# Patient Record
Sex: MALE | Race: WHITE | URBAN - METROPOLITAN AREA
[De-identification: names, ages, dates, MRNs, and addresses within clinical notes are randomized per-mention and may not be internally consistent; named-entity substitution may affect disease eponyms.]

---

## 2023-08-10 LAB — SARS-COV-2: NEGATIVE

## 2023-10-16 RX ORDER — LEVOTHYROXINE SODIUM 88 UG/1
88 TABLET ORAL DAILY
Qty: 31 TABLET | Refills: 12 | Status: SHIPPED | OUTPATIENT
Start: 2023-10-16

## 2023-12-27 ENCOUNTER — TELEMEDICINE (OUTPATIENT)
Age: 58
End: 2023-12-27
Payer: MEDICARE

## 2023-12-27 DIAGNOSIS — F79 INTELLECTUAL DISABILITY: Primary | ICD-10-CM

## 2023-12-27 PROCEDURE — 99213 OFFICE O/P EST LOW 20 MIN: CPT | Performed by: NURSE PRACTITIONER

## 2023-12-27 RX ORDER — METHYLPHENIDATE HYDROCHLORIDE 20 MG/1
20 TABLET ORAL 2 TIMES DAILY
Qty: 60 TABLET | Refills: 0 | Status: SHIPPED | OUTPATIENT
Start: 2023-12-27 | End: 2024-01-26

## 2023-12-27 NOTE — PROGRESS NOTES
Maksim Lu is a 60 y.o. male , id x 2(name and ). Reviewed questionnaires, and  medications. Chief Complaint   Patient presents with    Medication Refill       1. Have you been to the ER, urgent care clinic since your last visit? Hospitalized since your last visit? No    2. Have you seen or consulted any other health care providers outside of the [address] [place] [address] since your last visit? Include any pap smears or colon screening.  No

## 2023-12-27 NOTE — PROGRESS NOTES
Vianney Merchant, was evaluated through a synchronous (real-time) audio-video encounter. The patient (or guardian if applicable) is aware that this is a billable service, which includes applicable co-pays. This Virtual Visit was conducted with patient's (and/or legal guardian's) consent. Patient identification was verified, and a caregiver was present when appropriate. The patient was located at Home: [address]  Provider was located at Home ([facility]): [address] (:  1965) is a Established patient, presenting virtually for evaluation of the following:    Assessment & Plan   Below is the assessment and plan developed based on review of pertinent history, physical exam, labs, studies, and medications. 1. Intellectual disability  -     methylphenidate (RITALIN) 20 MG tablet; Take 1 tablet by mouth 2 times daily for 30 days. Max Daily Amount: 40 mg, Disp-60 tablet, R-0Normal  -     methylphenidate (RITALIN) 20 MG tablet; Take 1 tablet by mouth 2 times daily for 30 days. Max Daily Amount: 40 mg, Disp-60 tablet, R-0Normal  -     methylphenidate (RITALIN) 20 MG tablet; Take 1 tablet by mouth 2 times daily for 30 days. Max Daily Amount: 40 mg, Disp-60 tablet, R-0Normal    Refills updated today  Follow up 3 mo    No follow-ups on file. Subjective   HPI  Patient returns to office for follow up ADD. Stable on medication without weight loss, decreased appetite, insomnia, or tremor. Good focus control. Gets three months of scripts at a time. See med order for dose.     Review of Systems     A comprehensive review of system was obtained and negative except findings in the HPI    Objective   Patient-Reported Vitals  No data recorded     Physical Exam  [INSTRUCTIONS:  \"[x]\" Indicates a positive item  \"[]\" Indicates a negative item  -- DELETE ALL ITEMS NOT EXAMINED]    Constitutional: [x] Appears well-developed and well-nourished [x] No apparent

## 2024-01-15 ENCOUNTER — TELEPHONE (OUTPATIENT)
Age: 59
End: 2024-01-15

## 2024-01-15 RX ORDER — ESCITALOPRAM OXALATE 10 MG/1
10 TABLET ORAL DAILY
Qty: 30 TABLET | Refills: 2 | Status: SHIPPED | OUTPATIENT
Start: 2024-01-15

## 2024-01-15 NOTE — TELEPHONE ENCOUNTER
We received a fax refill request for Bola Merchant.  Please escribe Escitalopram to their pharmacy.  The pharmacy is correct in the chart and they are requesting a 31 day supply.

## 2024-01-16 ENCOUNTER — OFFICE VISIT (OUTPATIENT)
Age: 59
End: 2024-01-16
Payer: MEDICARE

## 2024-01-16 VITALS
TEMPERATURE: 98.4 F | OXYGEN SATURATION: 98 % | BODY MASS INDEX: 23.21 KG/M2 | SYSTOLIC BLOOD PRESSURE: 96 MMHG | HEIGHT: 63 IN | RESPIRATION RATE: 18 BRPM | HEART RATE: 60 BPM | WEIGHT: 131 LBS | DIASTOLIC BLOOD PRESSURE: 63 MMHG

## 2024-01-16 DIAGNOSIS — H91.93 BILATERAL HEARING LOSS, UNSPECIFIED HEARING LOSS TYPE: ICD-10-CM

## 2024-01-16 DIAGNOSIS — Z11.1 SCREENING-PULMONARY TB: Primary | ICD-10-CM

## 2024-01-16 PROCEDURE — G8484 FLU IMMUNIZE NO ADMIN: HCPCS | Performed by: NURSE PRACTITIONER

## 2024-01-16 PROCEDURE — 1036F TOBACCO NON-USER: CPT | Performed by: NURSE PRACTITIONER

## 2024-01-16 PROCEDURE — G8420 CALC BMI NORM PARAMETERS: HCPCS | Performed by: NURSE PRACTITIONER

## 2024-01-16 PROCEDURE — G8428 CUR MEDS NOT DOCUMENT: HCPCS | Performed by: NURSE PRACTITIONER

## 2024-01-16 PROCEDURE — 86580 TB INTRADERMAL TEST: CPT | Performed by: NURSE PRACTITIONER

## 2024-01-16 PROCEDURE — 3017F COLORECTAL CA SCREEN DOC REV: CPT | Performed by: NURSE PRACTITIONER

## 2024-01-16 PROCEDURE — 99213 OFFICE O/P EST LOW 20 MIN: CPT | Performed by: NURSE PRACTITIONER

## 2024-01-16 PROCEDURE — PBSHW MANTOUX TESTING: Performed by: NURSE PRACTITIONER

## 2024-01-16 ASSESSMENT — PATIENT HEALTH QUESTIONNAIRE - PHQ9: DEPRESSION UNABLE TO ASSESS: FUNCTIONAL CAPACITY MOTIVATION LIMITS ACCURACY

## 2024-01-16 NOTE — PROGRESS NOTES
Chief Complaint   Patient presents with    Follow-up     Patient presents in office today with c/o having difficulty hearing.  Caretaker states that he needs quantiferon gold drawn.  No other concerns.     1. \"Have you been to the ER, urgent care clinic since your last visit?  Hospitalized since your last visit?\" No    2. \"Have you seen or consulted any other health care providers outside of the [facility] since your last visit?\" No     3. For patients aged 45-75: Has the patient had a colonoscopy / FIT/ Cologuard? No      If the patient is female:    4. For patients aged 40-74: Has the patient had a mammogram within the past 2 years? NA - based on age or sex      5. For patients aged 21-65: Has the patient had a pap smear? NA - based on age or sex

## 2024-01-16 NOTE — PROGRESS NOTES
Subjective:      Patient ID: Bola PAGE is a 58 y.o. male.    HPI  Patient is here today for PPD test for the year  [facility] stock at [facility]    Brother cleaned ears out over the holidays  [facility] wants them checked, does have some hearing loss that is congenital  Denies pain    Review of Systems  A comprehensive review of system was obtained and negative except findings in the HPI    BP 96/63 (Site: Left Upper Arm, Position: Sitting)   Pulse 60   Temp 98.4 °F (36.9 °C) (Oral)   Resp 18   Ht 1.6 m (5' 3\")   Wt 59.4 kg (131 lb)   SpO2 98%   BMI 23.21 kg/m²   Objective:   Physical Exam  Vitals and nursing note reviewed.   Constitutional:       General: He is not in acute distress.     Appearance: Normal appearance.   HENT:      Right Ear: Tympanic membrane and ear canal normal.      Left Ear: Tympanic membrane and ear canal normal.   Cardiovascular:      Rate and Rhythm: Normal rate and regular rhythm.   Pulmonary:      Effort: Pulmonary effort is normal. No respiratory distress.      Breath sounds: Normal breath sounds. No wheezing or rhonchi.   Musculoskeletal:         General: No swelling.   Neurological:      General: No focal deficit present.      Mental Status: He is alert and oriented to person, place, and time.   Psychiatric:         Mood and Affect: Mood normal.         Assessment / Plan:   Bola was seen today for follow-up.    Diagnoses and all orders for this visit:    Screening-pulmonary TB  -     Cancel: QuantiFERON In Tube ([facility] Default); Future  -     Mantoux testing    Bilateral hearing loss, unspecified hearing loss type      PPD placed  Recheck Thursday  Reviewed hearing loss and wax buildup    I have discussed the diagnosis with the patient and the intended plan as seen in the above orders. The patient has received an after-visit summary and questions were answered concerning future plans. Patient conveyed understanding of the plan at the time of the visit.    Radha MIRZA

## 2024-01-17 ENCOUNTER — TELEPHONE (OUTPATIENT)
Age: 59
End: 2024-01-17

## 2024-01-17 ENCOUNTER — OFFICE VISIT (OUTPATIENT)
Age: 59
End: 2024-01-17
Payer: MEDICAID

## 2024-01-17 VITALS
HEIGHT: 63 IN | WEIGHT: 131 LBS | BODY MASS INDEX: 23.21 KG/M2 | TEMPERATURE: 96.4 F | DIASTOLIC BLOOD PRESSURE: 70 MMHG | SYSTOLIC BLOOD PRESSURE: 130 MMHG

## 2024-01-17 DIAGNOSIS — G30.0 MODERATE EARLY ONSET ALZHEIMER'S DEMENTIA WITHOUT BEHAVIORAL DISTURBANCE, PSYCHOTIC DISTURBANCE, MOOD DISTURBANCE, OR ANXIETY (HCC): Primary | ICD-10-CM

## 2024-01-17 DIAGNOSIS — F02.B0 MODERATE EARLY ONSET ALZHEIMER'S DEMENTIA WITHOUT BEHAVIORAL DISTURBANCE, PSYCHOTIC DISTURBANCE, MOOD DISTURBANCE, OR ANXIETY (HCC): Primary | ICD-10-CM

## 2024-01-17 PROCEDURE — 99244 OFF/OP CNSLTJ NEW/EST MOD 40: CPT | Performed by: PSYCHIATRY & NEUROLOGY

## 2024-01-17 RX ORDER — DONEPEZIL HYDROCHLORIDE 5 MG/1
5 TABLET, FILM COATED ORAL NIGHTLY
Qty: 30 TABLET | Refills: 5 | Status: SHIPPED | OUTPATIENT
Start: 2024-01-17

## 2024-01-17 NOTE — PROGRESS NOTES
includes daytime somnolence due to inadequate sleep hygiene and SUZETTE or related to his dementia. Patient has progressive memory problem previously seen by Dr Erickson in 2013. Tried Namenda XR but made him agitated and has a hard time taking a pill and so was stopped. Possible underlying Alzheimer's dementia, moderate stage.    RECOMMENDATIONS  1. I had a long discussion with patient and staff from [facility]. Discussed diagnosis, pathophysiology prognosis, available treatment and formulating plan. Reviewed test results. All questions were answered.  2. Will put back on Aricept 5 mg QHS to see if lack of response improve  3. Still on Lexapro for hany  4. Also still on Ritalin for hypersomnolence  5. Advise staff of [facility] to revisit reason why he is not on a CPAP for SUZETTE. Not sure if patient will be able to tolerate repeating sleep studies.  6. Reviewed medical records in [facility]    Return in about 3 months (around 4/17/2024).       Thank you for the consultation      Asad Moore MD  Diplomate, American Board of Psychiatry and Neurology  Diplomate, Neuromuscular Medicine  Diplomate, American Board of Electrodiagnostic Medicine          CC: Radha Hopkins, APRN - NP  Fax: [phone]

## 2024-01-18 ENCOUNTER — TELEPHONE (OUTPATIENT)
Age: 59
End: 2024-01-18

## 2024-01-18 LAB
INDURATION: 0
TB SKIN TEST: NORMAL

## 2024-01-18 NOTE — TELEPHONE ENCOUNTER
Pt's caregiver came into the office requesting an alternative medication for the Ritalin because the pharmacy told her that their is a shortage on the Ritalin and they do not know when the medication will be coming in to fill prescriptions.    Please call Kennedi  (caregiver) @ [phone]

## 2024-01-19 NOTE — TELEPHONE ENCOUNTER
Called Kennedi from . She states they saw Dr. Moore yesterday, but the only changed in his meds was that he started on aricept. He didn't say he wanted to stop methylphenidate. Will send to Radha to advise due to meds on backorder.   
Called and spoke to  Supervisor.(Kennedi) Kennedi states understanding per Rita NP:   She needs to get neuro advice, (Dr Moore) I didn't start the med and would not know the best alternative to give.     
Edwina from [facility] states that methylphenidate 20mg (also 10mg & 5mg) is on back order & does not know when it will come in. Can you change to something else? Edwina can be reached @[phone]  
Please find out from  what they want to do. He just saw Neuro today for routine consult.  Do they think he needs it? Did they discuss with Neuro? Radha  
She needs to get neuro advice, I didn't start the med and would not know the best alternative to give. Radha  
5

## 2024-01-22 ENCOUNTER — TELEPHONE (OUTPATIENT)
Age: 59
End: 2024-01-22

## 2024-01-22 DIAGNOSIS — F79 INTELLECTUAL DISABILITY: Primary | ICD-10-CM

## 2024-01-22 NOTE — TELEPHONE ENCOUNTER
methylphenidate (RITALIN) 20 MG tablet is still on back order is there something else you would like to call in

## 2024-01-22 NOTE — TELEPHONE ENCOUNTER
Requesting a call to discuss medication Sai    The pharmacy told her this medication on [employer] Back Order.    She's recommending another medication until this medication become available.    They called back at 2:54 pm stating the patient is out of medication.

## 2024-01-23 NOTE — TELEPHONE ENCOUNTER
According to Dr. Moore's note, he expects patient is still on ritalin 20 mg bid. He did restart him on aricept. 1/27/024.

## 2024-01-24 RX ORDER — METHYLPHENIDATE HYDROCHLORIDE 27 MG/1
27 TABLET ORAL DAILY
Qty: 30 TABLET | Refills: 0 | Status: SHIPPED | OUTPATIENT
Start: 2024-01-24 | End: 2024-02-23

## 2024-01-24 NOTE — TELEPHONE ENCOUNTER
I sent in 1 Rx of Concerta to see how he does. Please let [facility] know he will need follow up appt to discuss in 3 weeks. Radha

## 2024-01-25 NOTE — TELEPHONE ENCOUNTER
Called [facility] and spoke with Kennedi. Inform her that Dr. Moore does not prescribe pt Ritalin. Inform her aRdha Hopkins NP has been prescribing pt medication and that she sent in a refill yesterday. Kennedi verbalizes understanding.

## 2024-01-25 NOTE — TELEPHONE ENCOUNTER
Cont.  If [facility] can not then they have to schedule a vv with Radha in 3 weeks to see how new medication ( Concerta) is working. Kymberly states she would call [facility] to see if the original methylphenidate (Ritalin) will be supplied.

## 2024-01-25 NOTE — TELEPHONE ENCOUNTER
Returned call to Kymberly, from . States that [facility] was able to fill the methylphenidate but she does not know for how long. Explained to her that if [facility] can continue with same medication, fine, but if

## 2024-02-22 DIAGNOSIS — F79 INTELLECTUAL DISABILITY: Primary | ICD-10-CM

## 2024-02-22 NOTE — TELEPHONE ENCOUNTER
----- Message from Christal Ward sent at 2/22/2024  8:48 AM EST -----  Subject: Refill Request    QUESTIONS  Name of Medication? methylphenidate (RITALIN) 20 MG tablet  Patient-reported dosage and instructions? once daily  How many days do you have left? 1  Preferred Pharmacy? [facility]  Pharmacy phone number (if available)? [phone]  ---------------------------------------------------------------------------  --------------  CALL BACK INFO  What is the best way for the office to contact you? Do not leave any   message, patient will call back for answer  Preferred Call Back Phone Number? [phone]  ---------------------------------------------------------------------------  --------------  SCRIPT ANSWERS  Relationship to Patient? Covered Entity  Covered Entity Type? Pharmacy?  Representative Name? [place]

## 2024-02-23 RX ORDER — METHYLPHENIDATE HYDROCHLORIDE 20 MG/1
20 TABLET ORAL DAILY
Qty: 30 TABLET | Refills: 0 | Status: SHIPPED | OUTPATIENT
Start: 2024-02-23 | End: 2024-03-24

## 2024-03-19 DIAGNOSIS — F79 INTELLECTUAL DISABILITY: ICD-10-CM

## 2024-03-19 RX ORDER — METHYLPHENIDATE HYDROCHLORIDE 20 MG/1
20 TABLET ORAL DAILY
Qty: 30 TABLET | Refills: 0 | Status: SHIPPED | OUTPATIENT
Start: 2024-03-19 | End: 2024-04-18